# Patient Record
Sex: FEMALE | Race: WHITE | URBAN - METROPOLITAN AREA
[De-identification: names, ages, dates, MRNs, and addresses within clinical notes are randomized per-mention and may not be internally consistent; named-entity substitution may affect disease eponyms.]

---

## 2023-01-18 ENCOUNTER — OFFICE VISIT (OUTPATIENT)
Dept: URBAN - METROPOLITAN AREA CLINIC 44 | Facility: CLINIC | Age: 63
End: 2023-01-18
Payer: COMMERCIAL

## 2023-01-18 DIAGNOSIS — G24.5 BLEPHAROSPASM: ICD-10-CM

## 2023-01-18 DIAGNOSIS — H43.813 VITREOUS DEGENERATION, BILATERAL: Primary | ICD-10-CM

## 2023-01-18 DIAGNOSIS — H35.3131 BILATERAL NONEXUDATIVE AGE-RELATED MACULAR DEGENERATION, EARLY DRY STAGE: ICD-10-CM

## 2023-01-18 PROCEDURE — 92134 CPTRZ OPH DX IMG PST SGM RTA: CPT

## 2023-01-18 PROCEDURE — 99204 OFFICE O/P NEW MOD 45 MIN: CPT

## 2023-01-18 ASSESSMENT — INTRAOCULAR PRESSURE
OS: 15
OD: 15

## 2023-01-18 ASSESSMENT — VISUAL ACUITY
OD: 20/20
OS: 20/20

## 2023-01-18 ASSESSMENT — KERATOMETRY
OD: 45.50
OS: 45.25

## 2023-01-18 NOTE — IMPRESSION/PLAN
Impression: Blepharospasm: G24.5. Plan: RLL, has been symptomatic for months and bothersome to pt. Refer to oculoplastics in 4-6 weeks for consult.

## 2023-01-18 NOTE — IMPRESSION/PLAN
Impression: Vitreous degeneration, bilateral: H43.813. Plan: Ed pt on clinical findings. No retinal breaks/tears/detachments noted today. Advised pt to monitor for signs/symptoms of RD and to RTC stat if they develop. OCT MAC done today, no VMT.  RTC 8 weeks

## 2023-01-19 NOTE — IMPRESSION/PLAN
Impression: Bilateral nonexudative age-related macular degeneration, early dry stage: H35.3131. Plan: Macular degeneration, dry type with stable vision. Will continue to monitor vision and the patient has been instructed to call with any vision changes. Recommend the supplements AREDS II and a green, leafy diet. Highly recommended not to begin smoking. RTC 6 months for DFE/MAC No SRF/IRF per OCT today

## 2023-02-15 ENCOUNTER — OFFICE VISIT (OUTPATIENT)
Dept: URBAN - METROPOLITAN AREA CLINIC 44 | Facility: CLINIC | Age: 63
End: 2023-02-15
Payer: COMMERCIAL

## 2023-02-15 DIAGNOSIS — G24.5 BLEPHAROSPASM: Primary | ICD-10-CM

## 2023-02-15 PROCEDURE — 92285 EXTERNAL OCULAR PHOTOGRAPHY: CPT | Performed by: STUDENT IN AN ORGANIZED HEALTH CARE EDUCATION/TRAINING PROGRAM

## 2023-02-15 PROCEDURE — 99203 OFFICE O/P NEW LOW 30 MIN: CPT | Performed by: STUDENT IN AN ORGANIZED HEALTH CARE EDUCATION/TRAINING PROGRAM

## 2023-02-15 NOTE — IMPRESSION/PLAN
Impression: Blepharospasm: G24.5. Plan: Right upper and lower eyelid blepharospasm. No spasms noted on left side. No spasms of lower face to suggest hemifacial spasm. Discussed if spasms progress to involve lower face, would recommend MRI brain. Patient believes may be related to allergies. No other triggers identified (stress, caffeine, lack of sleep, etc). Bothersome/affects daily life. Discussed option of observation versus botox injection. Will seek insurance authorization for botox.